# Patient Record
Sex: FEMALE | Race: WHITE | NOT HISPANIC OR LATINO
[De-identification: names, ages, dates, MRNs, and addresses within clinical notes are randomized per-mention and may not be internally consistent; named-entity substitution may affect disease eponyms.]

---

## 2021-07-07 ENCOUNTER — TRANSCRIPTION ENCOUNTER (OUTPATIENT)
Age: 51
End: 2021-07-07

## 2024-02-09 ENCOUNTER — APPOINTMENT (OUTPATIENT)
Dept: ORTHOPEDIC SURGERY | Facility: CLINIC | Age: 54
End: 2024-02-09
Payer: COMMERCIAL

## 2024-02-09 VITALS — WEIGHT: 145 LBS | HEIGHT: 60 IN | BODY MASS INDEX: 28.47 KG/M2

## 2024-02-09 DIAGNOSIS — Z78.9 OTHER SPECIFIED HEALTH STATUS: ICD-10-CM

## 2024-02-09 DIAGNOSIS — M23.92 UNSPECIFIED INTERNAL DERANGEMENT OF LEFT KNEE: ICD-10-CM

## 2024-02-09 PROCEDURE — 73564 X-RAY EXAM KNEE 4 OR MORE: CPT | Mod: LT

## 2024-02-09 PROCEDURE — J3490M: CUSTOM

## 2024-02-09 PROCEDURE — L1833: CPT | Mod: LT

## 2024-02-09 PROCEDURE — 20611 DRAIN/INJ JOINT/BURSA W/US: CPT | Mod: LT

## 2024-02-09 PROCEDURE — 99203 OFFICE O/P NEW LOW 30 MIN: CPT | Mod: 25

## 2024-02-09 PROCEDURE — 99204 OFFICE O/P NEW MOD 45 MIN: CPT | Mod: 25

## 2024-02-09 NOTE — IMAGING
[de-identified] : Mild effusion, no warmth, no ecchymosis Medial and posterior joint line tenderness to palpation Range of motion 0-130 5/5 quadriceps and hamstring strength Positive [facility] No varus or valgus instability, negative lachman Motor and sensory intact distally Mildly antalgic gait [Left] : left knee [All Views] : anteroposterior, lateral, skyline, and anteroposterior standing [Mild tricompartmental OA medial narrowing] : Mild tricompartmental OA medial narrowing [Mild tricompartmental OA lateral narrowing] : Mild tricompartmental OA lateral narrowing [Mild patellofemoral OA] : Mild patellofemoral OA

## 2024-02-09 NOTE — HISTORY OF PRESENT ILLNESS
[Sharp] : sharp [Constant] : constant [de-identified] : 02/09/2024: PATIENT PRESENTS TODAY FOR A NEW INJURY VISIT FOR THE L KNEE. STATES SHE STARTED GETTING PAIN 1 WEEK AGO AFTER BEING JUMPED ON BY HER DOG. NO TREATMENT TO DATE.  [] : no [FreeTextEntry1] : L KNEE

## 2024-02-09 NOTE — ASSESSMENT
[FreeTextEntry1] : TRAUMATIC LEFT KNEE PAIN S/P BEING JUMPED ON BY A DOG  EXAM WITH POSTERIOR AND MEDIAL JLT AND MCL TENDERNESS, POSITIVE AMIRA  MINIMAL OA ON XR TODAY  WILL OBTAIN MRI OF LEFT KNEE TO EVAL FOR POSTERIOR MMT AND MCL  CSI DISCUSSED AND DONE FOR PAIN - TOLERATED WELL  [employer] FITTED AND DISPENSED TODAY FOR WB SUPPORT  ADVISED CRYOTHERAPY AND NSAIDS PRN  RTC AFTER MRI TO REVIEW RESULTS

## 2024-02-12 ENCOUNTER — APPOINTMENT (OUTPATIENT)
Dept: MRI IMAGING | Facility: CLINIC | Age: 54
End: 2024-02-12
Payer: COMMERCIAL

## 2024-02-12 PROCEDURE — 73721 MRI JNT OF LWR EXTRE W/O DYE: CPT | Mod: LT

## 2024-02-14 ENCOUNTER — APPOINTMENT (OUTPATIENT)
Dept: ORTHOPEDIC SURGERY | Facility: CLINIC | Age: 54
End: 2024-02-14
Payer: COMMERCIAL

## 2024-02-14 VITALS — WEIGHT: 145 LBS | HEIGHT: 60 IN | BODY MASS INDEX: 28.47 KG/M2

## 2024-02-14 DIAGNOSIS — M17.12 UNILATERAL PRIMARY OSTEOARTHRITIS, LEFT KNEE: ICD-10-CM

## 2024-02-14 DIAGNOSIS — S83.232D COMPLEX TEAR OF MEDIAL MENISCUS, CURRENT INJURY, LEFT KNEE, SUBSEQUENT ENCOUNTER: ICD-10-CM

## 2024-02-14 PROCEDURE — 99213 OFFICE O/P EST LOW 20 MIN: CPT

## 2024-02-14 PROCEDURE — 99214 OFFICE O/P EST MOD 30 MIN: CPT

## 2024-02-14 NOTE — IMAGING
[de-identified] : Mild effusion, no warmth, no ecchymosis Medial joint line tenderness to palpation Range of motion 0-130 5/5 quadriceps and hamstring strength Positive Tez No varus or valgus instability, negative lachman Motor and sensory intact distally Mildly antalgic gait

## 2024-02-14 NOTE — ASSESSMENT
[FreeTextEntry1] : Reviewed MRI in detail.  As I suspect that she does have a fairly complex medial meniscal tear with superimposed significant degeneration of the body of the meniscus.  She reports only mild pain before this injury with her dog and therefore I believe the injury exacerbated and caused the remainder of the meniscus to tear.  She has minimal arthritis in the tibiofemoral joint.  She had no relief with the corticosteroid injection and has had marked difficulty with ambulation with persistent mechanical symptoms and at this time I believe she is a good candidate for arthroscopic partial medial meniscectomy.  Postoperative course outlined.  She wants to proceed.  The risks and benefits of surgery have been discussed. Risks include but are not limited to bleeding, infection, reaction to anesthesia, injury to blood vessels and nerves, malunion, nonunion, DVT, PE, necessity of repeat surgery, chronic pain, loss of limb and death. The patient understands the risks and agrees with the surgical plan. All questions have been answered.

## 2024-02-16 ENCOUNTER — APPOINTMENT (OUTPATIENT)
Dept: MRI IMAGING | Facility: CLINIC | Age: 54
End: 2024-02-16

## 2024-03-26 ENCOUNTER — APPOINTMENT (OUTPATIENT)
Dept: ORTHOPEDIC SURGERY | Facility: AMBULATORY SURGERY CENTER | Age: 54
End: 2024-03-26

## 2024-04-03 ENCOUNTER — APPOINTMENT (OUTPATIENT)
Dept: ORTHOPEDIC SURGERY | Facility: CLINIC | Age: 54
End: 2024-04-03